# Patient Record
Sex: MALE | Race: BLACK OR AFRICAN AMERICAN | NOT HISPANIC OR LATINO | Employment: FULL TIME | URBAN - METROPOLITAN AREA
[De-identification: names, ages, dates, MRNs, and addresses within clinical notes are randomized per-mention and may not be internally consistent; named-entity substitution may affect disease eponyms.]

---

## 2023-10-18 ENCOUNTER — TELEPHONE (OUTPATIENT)
Dept: OPHTHALMOLOGY | Facility: CLINIC | Age: 48
End: 2023-10-18

## 2023-10-18 NOTE — TELEPHONE ENCOUNTER
Returned a call to [facility]  He wants to order contacts but had not been seen since 2018.  I advised him to call and get an appointment.

## 2023-10-24 PROBLEM — H52.203 ASTIGMATISM, BILATERAL: Status: ACTIVE | Noted: 2023-10-24

## 2023-10-24 PROBLEM — H18.603 KERATOCONUS OF BOTH EYES: Status: ACTIVE | Noted: 2023-10-24

## 2023-10-24 PROBLEM — H44.20 DEGENERATIVE MYOPIA: Status: ACTIVE | Noted: 2023-10-24

## 2023-10-25 ENCOUNTER — OFFICE VISIT (OUTPATIENT)
Dept: OPHTHALMOLOGY | Facility: CLINIC | Age: 48
End: 2023-10-25

## 2023-10-25 DIAGNOSIS — H18.603 KERATOCONUS OF BOTH EYES: Primary | ICD-10-CM

## 2023-10-25 ASSESSMENT — VISUAL ACUITY
OS_SC: CF@2FT
OD_CC: 20/30
OD_CC+: +1
CORRECTION_TYPE: CONTACTS
METHOD: SNELLEN - LINEAR

## 2023-10-25 ASSESSMENT — CONF VISUAL FIELD
OD_INFERIOR_NASAL_RESTRICTION: 0
OS_SUPERIOR_NASAL_RESTRICTION: 0
OD_SUPERIOR_TEMPORAL_RESTRICTION: 0
OD_INFERIOR_TEMPORAL_RESTRICTION: 0
OD_NORMAL: 1
OS_INFERIOR_NASAL_RESTRICTION: 0
OS_SUPERIOR_TEMPORAL_RESTRICTION: 0
OD_SUPERIOR_NASAL_RESTRICTION: 0
OS_INFERIOR_TEMPORAL_RESTRICTION: 0
OS_NORMAL: 1

## 2023-10-25 ASSESSMENT — ENCOUNTER SYMPTOMS
CONSTITUTIONAL NEGATIVE: 0
ENDOCRINE NEGATIVE: 0
EYES NEGATIVE: 0
NEUROLOGICAL NEGATIVE: 0
PSYCHIATRIC NEGATIVE: 0
GASTROINTESTINAL NEGATIVE: 0
CARDIOVASCULAR NEGATIVE: 0
ALLERGIC/IMMUNOLOGIC NEGATIVE: 0
MUSCULOSKELETAL NEGATIVE: 0
RESPIRATORY NEGATIVE: 0
HEMATOLOGIC/LYMPHATIC NEGATIVE: 0

## 2023-10-25 ASSESSMENT — TONOMETRY
OS_IOP_MMHG: 12
OD_IOP_MMHG: 12
IOP_METHOD: GOLDMANN APPLANATION

## 2023-10-26 NOTE — PROGRESS NOTES
Patient left without being seen due to cost of contact lenses and being self pay. Spent about 30 minutes discussing how everything would be paid for with doctor, technician and . Will reschedule to a later date.

## 2024-01-09 ENCOUNTER — OFFICE VISIT (OUTPATIENT)
Dept: OPHTHALMOLOGY | Facility: CLINIC | Age: 49
End: 2024-01-09

## 2024-01-09 DIAGNOSIS — H18.623 UNSTABLE KERATOCONUS OF BOTH EYES: ICD-10-CM

## 2024-01-09 DIAGNOSIS — H18.603 KERATOCONUS OF BOTH EYES: Primary | ICD-10-CM

## 2024-01-09 LAB
KMAX (OD): 73.2 DIOPTERS
KMAX (OS): 62.8 DIOPTERS
PACH THINNEST (OD): 342 MICRONS
PACH THINNEST (OS): 263 MICRONS
SIMK FLAT (OD): 55.1 DIOPTERS
SIMK FLAT (OS): 54.5 DIOPTERS
SIMK STEEP (OD): 64.9 DIOPTERS
SIMK STEEP (OS): 57.1 DIOPTERS

## 2024-01-09 PROCEDURE — 92025 CPTRIZED CORNEAL TOPOGRAPHY: CPT | Performed by: OPTOMETRIST

## 2024-01-09 PROCEDURE — 99212 OFFICE O/P EST SF 10 MIN: CPT | Performed by: OPTOMETRIST

## 2024-01-09 ASSESSMENT — VISUAL ACUITY
OD_CC+: -2
VA_OR_OS_CURRENT_RX: 20/40
METHOD: SNELLEN - LINEAR
OD_CC: 20/30
OS_CC: CF @ FACE
VA_OR_OD_CURRENT_RX: 20/30

## 2024-01-09 ASSESSMENT — EXTERNAL EXAM - LEFT EYE: OS_EXAM: NORMAL

## 2024-01-09 ASSESSMENT — SLIT LAMP EXAM - LIDS
COMMENTS: GOOD POSITION
COMMENTS: GOOD POSITION

## 2024-01-09 ASSESSMENT — REFRACTION_CURRENTRX
OS_ADDL_SPECS: HABITUAL
OD_DIAMETER: 16.0
OD_SPHERE: -2.00
OS_SPHERE: -2.00
OD_BASECURVE: 6.7
OS_BRAND: ZENLENS PROLATE
OS_BASECURVE: 6.7
OD_ADDL_SPECS: HABITUAL
OS_BASECURVE: 6.7
OD_BASECURVE: 6.4
OD_BASECURVE: 6.7
OS_DIAMETER: 16.0
OD_DIAMETER: 14
OD_BRAND: ZENLENS PROLATE
OD_DIAMETER: 16.0
OD_BRAND: ZENLENS PROLATE
OD_SPHERE: -12.00
OS_SPHERE: -14.00
OS_BRAND: ZENLENS PROLATE
OS_DIAMETER: 14
OS_BASECURVE: 6.7
OS_DIAMETER: 16.0

## 2024-01-09 ASSESSMENT — ENCOUNTER SYMPTOMS
MUSCULOSKELETAL NEGATIVE: 0
NEUROLOGICAL NEGATIVE: 0
ENDOCRINE NEGATIVE: 0
CONSTITUTIONAL NEGATIVE: 0
EYES NEGATIVE: 0
ALLERGIC/IMMUNOLOGIC NEGATIVE: 0
HEMATOLOGIC/LYMPHATIC NEGATIVE: 0
RESPIRATORY NEGATIVE: 0
GASTROINTESTINAL NEGATIVE: 0
PSYCHIATRIC NEGATIVE: 0
CARDIOVASCULAR NEGATIVE: 0

## 2024-01-09 ASSESSMENT — REFRACTION_MANIFEST
OD_SPHERE: UNABLE
OS_SPHERE: UNABLE

## 2024-01-09 ASSESSMENT — EXTERNAL EXAM - RIGHT EYE: OD_EXAM: NORMAL

## 2024-01-09 NOTE — PROGRESS NOTES
Assessment/Plan   Diagnoses and all orders for this visit:  Keratoconus of both eyes  -     Corneal Topography - OU - Both Eyes  Unstable keratoconus of both eyes    Proceed with keratoconus scleral lens refit, current CL Right eye has apical touch, need full scleral with apical clearance    Proceed when CL benefit becomes available (order RX2 and then return when in with full fitting and CL charged upon dispensing visit)

## 2025-01-24 ENCOUNTER — TELEPHONE (OUTPATIENT)
Dept: OPHTHALMOLOGY | Facility: CLINIC | Age: 50
End: 2025-01-24
Payer: COMMERCIAL

## 2025-01-24 ENCOUNTER — APPOINTMENT (OUTPATIENT)
Dept: OPHTHALMOLOGY | Facility: CLINIC | Age: 50
End: 2025-01-24
Payer: COMMERCIAL

## 2025-01-24 NOTE — TELEPHONE ENCOUNTER
SPOKE WITH PT. CX APPT 1/24/25 DUE TO BEING SCHEDULED WITH WRONG TYPE OF PROVIDER. APOLOGIZED FOR SCHEDULING'S ERROR AND PT WAS GRATEFUL I FOUND THE ISSUE, CALLED AND DISCUSSED WITH HIM.PATRICIA

## 2025-01-28 ENCOUNTER — APPOINTMENT (OUTPATIENT)
Dept: OPHTHALMOLOGY | Facility: CLINIC | Age: 50
End: 2025-01-28
Payer: COMMERCIAL

## 2025-01-28 DIAGNOSIS — H18.603 KERATOCONUS OF BOTH EYES: Primary | ICD-10-CM

## 2025-01-28 LAB
CENTRAL CORNEA THICKNESS (OD): 318 MICRONS
CENTRAL CORNEA THICKNESS (OS): 314 MICRONS
KMAX (OD): 68.4 DIOPTERS
KMAX (OS): 64.5 DIOPTERS
PACH THINNEST (OD): 307 MICRONS
PACH THINNEST (OS): 253 MICRONS
POSTERIOR FLOAT (OD): 322 MICRONS
POSTERIOR FLOAT (OS): 256 MICRONS
SIMK FLAT (OD): 56.5 DIOPTERS
SIMK FLAT (OS): 55.8 DIOPTERS
SIMK STEEP (OD): 61.9 DIOPTERS
SIMK STEEP (OS): 60.7 DIOPTERS
SIMK STEEP AXIS (OD): 113.7 DEGREES
SIMK STEEP AXIS (OS): 90.8 DEGREES

## 2025-01-28 RX ORDER — SODIUM CHLORIDE FOR INHALATION 0.9 %
VIAL, NEBULIZER (ML) INHALATION
Qty: 500 ML | Refills: 3 | Status: SHIPPED | OUTPATIENT
Start: 2025-01-28

## 2025-01-28 ASSESSMENT — REFRACTION_MANIFEST
OS_SPHERE: -17.50
OS_SPHERE: -17.50
OS_CYLINDER: -2.50
OD_AXIS: 060
OD_SPHERE: -18.75
METHOD_AUTOREFRACTION: 1
OS_CYLINDER: -2.25
OS_AXIS: 180
OD_CYLINDER: SPHERE
OS_AXIS: 180
OD_CYLINDER: -2.50

## 2025-01-28 ASSESSMENT — VISUAL ACUITY
VA_OR_OD_CURRENT_RX: 20/30-2
METHOD: SNELLEN - LINEAR
OD_CC+: -1
VA_OR_OS_CURRENT_RX: 20/40-2
OD_CC: 20/40
OS_PH_SC: 20/400

## 2025-01-28 ASSESSMENT — REFRACTION_CURRENTRX
OD_DIAMETER: 16.0
OS_BRAND: ZENLENS PROLATE
OD_BRAND: ZENLENS PROLATE
OS_BASECURVE: 6.7
OS_DIAMETER: 16.0
OD_BASECURVE: 6.7
OD_BRAND: ZENLENS PROLATE
OS_SPHERE: -2.00
OD_SPHERE: -2.00
OD_SPHERE: -10.87
OS_SPHERE: -12.50
OS_BRAND: ZENLENS PROLATE
OD_DIAMETER: 16.0
OS_DIAMETER: 16.0
OS_BASECURVE: 6.4
OD_BASECURVE: 6.7

## 2025-01-28 ASSESSMENT — CONF VISUAL FIELD
METHOD: COUNTING FINGERS
OS_SUPERIOR_NASAL_RESTRICTION: 0
OD_SUPERIOR_NASAL_RESTRICTION: 0
OS_INFERIOR_TEMPORAL_RESTRICTION: 0
OS_NORMAL: 1
OD_NORMAL: 1
OS_SUPERIOR_TEMPORAL_RESTRICTION: 0
OD_INFERIOR_TEMPORAL_RESTRICTION: 0
OD_INFERIOR_NASAL_RESTRICTION: 0
OS_INFERIOR_NASAL_RESTRICTION: 0
OD_SUPERIOR_TEMPORAL_RESTRICTION: 0

## 2025-01-28 ASSESSMENT — ENCOUNTER SYMPTOMS
ENDOCRINE NEGATIVE: 0
CARDIOVASCULAR NEGATIVE: 0
GASTROINTESTINAL NEGATIVE: 0
PSYCHIATRIC NEGATIVE: 0
ALLERGIC/IMMUNOLOGIC NEGATIVE: 0
MUSCULOSKELETAL NEGATIVE: 0
NEUROLOGICAL NEGATIVE: 0
RESPIRATORY NEGATIVE: 0
HEMATOLOGIC/LYMPHATIC NEGATIVE: 0
CONSTITUTIONAL NEGATIVE: 0
EYES NEGATIVE: 1

## 2025-01-28 ASSESSMENT — EXTERNAL EXAM - RIGHT EYE: OD_EXAM: NORMAL

## 2025-01-28 ASSESSMENT — SLIT LAMP EXAM - LIDS
COMMENTS: GOOD POSITION
COMMENTS: GOOD POSITION

## 2025-01-28 ASSESSMENT — EXTERNAL EXAM - LEFT EYE: OS_EXAM: NORMAL

## 2025-01-28 NOTE — Clinical Note
Both eyes (OU) Contact Lens Order  Quantity: 1 Package: [facility] Appointment needed? Yes Medically necessary? Yes Ship To: [facility] Additional instructions: RX2

## 2025-01-28 NOTE — RESEARCH NOTES
STUDY TEAM VISIT NOTE  Study Name: Genetic Risk of Recurrent Keratoconus Study  IRB#: [id number]  PI: Janeth Roblero OD, PhD  Coordinator for Today's Visit: Callie Melgar    Time point: Enrollment    Encounter Date: 01/28/2025  Encounter Time:  8:00 AM EST  Encounter Department: [facility]    INFORMED CONSENT   Review and sign Informed Consent Form with participant prior to beginning any study procedures: Yes  Document Informed Consent process on  Informed Consent Documentation Template and EHR : Yes   Subject age should be assessed at each visit to confirm current ICF is applicable: Yes      ALLERGY & HISTORY REVIEW   No Known Allergies  Past Medical History:   Diagnosis Date    Keratoconus, unspecified, unspecified eye     Keratoconus    Other specified health status     No pertinent past surgical history     History reviewed. No pertinent surgical history.  Family History   Problem Relation Name Age of Onset    Bone cancer Other grand mother         ASSESSMENT   Patient presented for enrollment into the Genetic Risk of Recurrent Keratoconus study.    Patient was consented and the Informed consent was obtained per Department SOP guidelines. The subject was found eligible to participate in the study.    All study data can be found on Electronic Data Capture system and on Case Report forms (as needed). Study procedures performed per protocol.     Visit completed uneventfully.      COORDINATOR CHECKLIST   Subject visit entered in [employer].  Subject compensation provided.  Were any assessments not completed during this visit?  If Yes, document protocol deviation.     Callie Melgar  01/28/25

## 2025-03-03 ENCOUNTER — APPOINTMENT (OUTPATIENT)
Dept: OPHTHALMOLOGY | Facility: CLINIC | Age: 50
End: 2025-03-03
Payer: COMMERCIAL

## 2025-03-04 ENCOUNTER — OFFICE VISIT (OUTPATIENT)
Dept: OPHTHALMOLOGY | Facility: CLINIC | Age: 50
End: 2025-03-04
Payer: COMMERCIAL

## 2025-03-04 DIAGNOSIS — H18.603 KERATOCONUS OF BOTH EYES: ICD-10-CM

## 2025-03-04 DIAGNOSIS — H18.603 KERATOCONUS OF BOTH EYES: Primary | ICD-10-CM

## 2025-03-04 PROCEDURE — 99212 OFFICE O/P EST SF 10 MIN: CPT | Performed by: OPTOMETRIST

## 2025-03-04 RX ORDER — SODIUM CHLORIDE FOR INHALATION 0.9 %
VIAL, NEBULIZER (ML) INHALATION
Qty: 500 ML | Refills: 3 | Status: SHIPPED | OUTPATIENT
Start: 2025-03-04 | End: 2025-03-04

## 2025-03-04 RX ORDER — SODIUM CHLORIDE FOR INHALATION 0.9 %
VIAL, NEBULIZER (ML) INHALATION
Qty: 300 ML | Refills: 3 | Status: SHIPPED | OUTPATIENT
Start: 2025-03-04

## 2025-03-04 ASSESSMENT — ENCOUNTER SYMPTOMS
ALLERGIC/IMMUNOLOGIC NEGATIVE: 0
PSYCHIATRIC NEGATIVE: 0
HEMATOLOGIC/LYMPHATIC NEGATIVE: 0
EYES NEGATIVE: 0
GASTROINTESTINAL NEGATIVE: 0
NEUROLOGICAL NEGATIVE: 0
ENDOCRINE NEGATIVE: 0
CONSTITUTIONAL NEGATIVE: 0
CARDIOVASCULAR NEGATIVE: 0
RESPIRATORY NEGATIVE: 0
MUSCULOSKELETAL NEGATIVE: 0

## 2025-03-04 ASSESSMENT — VISUAL ACUITY
OD_CC+: -2+2
VA_OR_OS_CURRENT_RX: 20/30-2+1
VA_OR_OD_CURRENT_RX: 20/25-2
OD_CC: 20/30
OS_SC: CF@6FT
METHOD: SNELLEN - LINEAR
CORRECTION_TYPE: CONTACTS

## 2025-03-04 ASSESSMENT — REFRACTION_CURRENTRX
OD_DIAMETER: 16.0
OD_BASECURVE: 6.7
OS_SPHERE: -12.50
OS_DIAMETER: 16.0
OD_SPHERE: -10.87
OS_BRAND: ZENLENS PROLATE
OS_BASECURVE: 6.7
OD_BRAND: ZENLENS PROLATE

## 2025-03-04 ASSESSMENT — SLIT LAMP EXAM - LIDS
COMMENTS: GOOD POSITION
COMMENTS: GOOD POSITION

## 2025-03-04 ASSESSMENT — EXTERNAL EXAM - LEFT EYE: OS_EXAM: NORMAL

## 2025-03-04 ASSESSMENT — EXTERNAL EXAM - RIGHT EYE: OD_EXAM: NORMAL

## 2025-03-04 NOTE — PROGRESS NOTES
Assessment/Plan   Diagnoses and all orders for this visit:  Keratoconus of both eyes  -     sodium chloride 0.9 % nebulizer solution; (100) 5 ml non-preserved vials, Use off-label to insert into bowl of scleral contact lens prior to insertion    Sent RX to [place] [id number] [facility]     Specialty Contact Lenses:  Specialty contact lenses were dispensed today for treatment of a medically indicated condition. The patient was educated on the proper care, handling, insertion and removal of the lenses.  The patient should follow the wearing time and return for follow-up as indicated, and call or come in to the office if the eye becomes red or painful after wearing the lenses.     Follow up 3 weeks to reassess fit

## 2025-03-07 RX ORDER — SODIUM CHLORIDE FOR INHALATION 0.9 %
3 VIAL, NEBULIZER (ML) INHALATION AS NEEDED
Qty: 90 ML | Refills: 12 | OUTPATIENT
Start: 2025-03-07 | End: 2026-03-07

## 2025-03-07 NOTE — TELEPHONE ENCOUNTER
Adjusted non-preserved saline  (100) 3 ML NON-PRESERVED VIALS, USE 1 vial per day OFF-LABEL TO INSERT INTO BOWL OF SCLERAL CONTACT LENS PRIOR TO INSERTION

## 2025-03-11 ENCOUNTER — APPOINTMENT (OUTPATIENT)
Dept: OPHTHALMOLOGY | Facility: CLINIC | Age: 50
End: 2025-03-11
Payer: COMMERCIAL

## 2025-04-01 ENCOUNTER — APPOINTMENT (OUTPATIENT)
Dept: OPHTHALMOLOGY | Facility: CLINIC | Age: 50
End: 2025-04-01
Payer: COMMERCIAL

## 2025-04-15 ENCOUNTER — APPOINTMENT (OUTPATIENT)
Dept: OPHTHALMOLOGY | Facility: CLINIC | Age: 50
End: 2025-04-15
Payer: COMMERCIAL